# Patient Record
Sex: MALE | Race: WHITE
[De-identification: names, ages, dates, MRNs, and addresses within clinical notes are randomized per-mention and may not be internally consistent; named-entity substitution may affect disease eponyms.]

---

## 2023-07-24 ENCOUNTER — TRANSCRIPTION ENCOUNTER (OUTPATIENT)
Age: 68
End: 2023-07-24

## 2023-07-24 ENCOUNTER — APPOINTMENT (OUTPATIENT)
Dept: ORTHOPEDIC SURGERY | Facility: CLINIC | Age: 68
End: 2023-07-24
Payer: MEDICARE

## 2023-07-24 VITALS — HEIGHT: 74 IN | WEIGHT: 275 LBS | BODY MASS INDEX: 35.29 KG/M2

## 2023-07-24 DIAGNOSIS — I10 ESSENTIAL (PRIMARY) HYPERTENSION: ICD-10-CM

## 2023-07-24 DIAGNOSIS — Z00.00 ENCOUNTER FOR GENERAL ADULT MEDICAL EXAMINATION W/OUT ABNORMAL FINDINGS: ICD-10-CM

## 2023-07-24 DIAGNOSIS — M70.60 TROCHANTERIC BURSITIS, UNSPECIFIED HIP: ICD-10-CM

## 2023-07-24 PROCEDURE — 73522 X-RAY EXAM HIPS BI 3-4 VIEWS: CPT

## 2023-07-24 RX ORDER — METHYLPREDNISOLONE 4 MG/1
4 TABLET ORAL
Qty: 1 | Refills: 0 | Status: ACTIVE | COMMUNITY
Start: 2023-07-24 | End: 1900-01-01

## 2023-07-24 NOTE — IMAGING
[Bilateral] : hip with pelvis bilaterally [AP] : anteroposterior [Lateral] : lateral [There are no fractures, subluxations or dislocations. No significant abnormalities are seen] : There are no fractures, subluxations or dislocations. No significant abnormalities are seen [de-identified] : The patient is a well appearing 67 year.\par \par Patient ambulates with an antalgic gait. \par \par General: in no acute distress, seated comfortably, moving easily\par Skin: No discoloration, rashes; on palpation skin is dry, \par Neuro: Normal sensation all dermatomes, motor all myotomes\par Vascular: Normal pulses, no edema, normal temperature\par Coordination and balance: Normal\par Psych: normal mood and affect, non pressured speech, alert and oriented x3\par \par Pelvis: \par \par Symphysis pubis: Stable, no tenderness to palpation \par Palpable Hernias/Masses: None \par Resisted Sit Up: Negative \par \par Right Hip/Groin/Thigh: \par ROM: \par     Flexion: 0-120 degrees \par     Extension: 0-10 degrees \par     ABduction: 0-40 degrees \par     Adduction: 0-40 degrees \par     External Rotation: 0-40 degrees \par     Internal Rotation: 0-35 degrees \par PROVOCATIVE TESTING: \par      AV TST: Negative \par      DIMAS'S TST: Negative \par      PIRIFORMIS TST: Negative \par      Straight Leg Raise:  Negative \par      Seated Straight Leg Raise: Negative \par      IMPINGEMENT TST: Negative \par      Resisted ADduction : Negative \par \par PALPATION: \par         ASIS: Nontender \par         AIIS: Nontender \par         Greater Trochanter/IT-Band: Nontender \par         Illiac Crest: Nontender \par         Ischial Tuberosity: TTP \par         Hip Flexor: Nontender \par         Quadriceps: Nontender \par         Proximal Hamstring Origin: TTP \par         Proximal Hamstring Muscle-Tendon Junction: TTP \par         Hamstring Muscle Belly: Nontender \par         ADductor :  Nontender  \par         Lower Rectus Abdominis:  Nontender \par INSPECTION: \par         Deformity: No  \par         Erythema: No \par         Ecchymosis: No \par         Abrasions: No \par         Effusion: No \par         Groin mass/bulge: No \par        Palpable Hernia: No \par NEUROLOGIC EXAM: \par         Sensation L2-S1: Grossly Intact \par MOTOR EXAM: \par         Quadriceps: 5 out of 5 \par         Hamstrings: 5 out of 5 \par         ABduction: 5 out of 5 \par         ADduction: 5 out of 5 \par         Hip Flexion: 5 out of 5 \par         TA: 5 out of 5 \par         GS: 5 out of 5 \par Circulatory/Pulses: \par         Dorsalis Pedis:      2+ \par         Posterior Tibialis: 2+ \par  \par Left Hip/Groin/Thigh: \par ROM: \par     Flexion: 0-120 degrees \par     Extension: 0-10 degrees \par     ABduction: 0-40 degrees \par     Adduction: 0-40 degrees \par     External Rotation: 0-40 degrees \par     Internal Rotation: 0-35 degrees \par PROVOCATIVE TESTING: \par      AV TST: Negative \par      DIMAS'S TST: Negative \par      PIRIFORMIS TST: Negative \par      Straight Leg Raise:  Negative \par      Seated Straight Leg Raise: Negative \par      IMPINGEMENT TST: Negative \par      Resisted ADduction : Negative \par PALPATION: \par         ASIS: Nontender \par         AIIS: Nontender \par         Greater Trochanter/IT-Band: Nontender \par         Illiac Crest: Nontender \par         Ischial Tuberosity: Nontender \par         Hip Flexor: Nontender \par         Quadriceps: Nontender \par         Proximal Hamstring Origin: Nontender \par         Proximal Hamstring Muscle-Tendon Junction: Nontender \par         Hamstring Muscle Belly: Nontender \par         ADductor :  Nontender  \par         Lower Rectus Abdominis:  Nontender \par INSPECTION: \par         Deformity: No  \par         Erythema: No \par         Ecchymosis: No \par         Abrasions: No \par         Effusion: No \par         Groin mass/bulge: No \par        Palpable Hernia: No \par NEUROLOGIC EXAM: \par         Sensation L2-S1: Grossly Intact \par MOTOR EXAM: \par         Quadriceps: 5 out of 5 \par         Hamstrings: 5 out of 5 \par         ABduction: 5 out of 5 \par         ADduction: 5 out of 5 \par         Hip Flexion: 5 out of 5 \par         TA: 5 out of 5 \par         GS: 5 out of 5 \par Circulatory/Pulses: \par         Dorsalis Pedis:      2+ \par         Posterior Tibialis: 2+\par

## 2023-07-24 NOTE — HISTORY OF PRESENT ILLNESS
[de-identified] : 07/24/23: DARON is a 67 year old RHD M who presents today complaining of suyapa hip pain L>R \par Date of Injury/Onset: 2+ years\par Pain:    At Rest:  0/10 \par With Activity:  2-3 /10 \par Mechanism of injury: No specific cause of injury/ trauma\par Quality of symptoms: Sharp discomfort\par Improves with: N/A\par Worse with: Prolonged walking \par Prior treatment/ Imaging: None \par School/Sport/Position/Occupation: Retired  \par Additional Information: [None]\par

## 2024-04-23 RX ORDER — CELECOXIB 200 MG/1
200 CAPSULE ORAL
Qty: 60 | Refills: 0 | Status: ACTIVE | COMMUNITY
Start: 2024-04-23 | End: 1900-01-01

## 2024-05-09 ENCOUNTER — APPOINTMENT (OUTPATIENT)
Dept: ORTHOPEDIC SURGERY | Facility: CLINIC | Age: 69
End: 2024-05-09
Payer: MEDICARE

## 2024-05-09 DIAGNOSIS — M16.11 UNILATERAL PRIMARY OSTEOARTHRITIS, RIGHT HIP: ICD-10-CM

## 2024-05-09 DIAGNOSIS — M16.12 UNILATERAL PRIMARY OSTEOARTHRITIS, LEFT HIP: ICD-10-CM

## 2024-05-09 PROCEDURE — 99204 OFFICE O/P NEW MOD 45 MIN: CPT | Mod: 25

## 2024-05-09 PROCEDURE — 20611 DRAIN/INJ JOINT/BURSA W/US: CPT | Mod: 50

## 2024-05-09 RX ORDER — SEMAGLUTIDE 0.68 MG/ML
INJECTION, SOLUTION SUBCUTANEOUS
Refills: 0 | Status: ACTIVE | COMMUNITY

## 2024-05-09 RX ORDER — VALSARTAN 160 MG/1
160 TABLET, COATED ORAL
Refills: 0 | Status: ACTIVE | COMMUNITY

## 2024-05-09 RX ORDER — APIXABAN 5 MG/1
5 TABLET, FILM COATED ORAL
Refills: 0 | Status: ACTIVE | COMMUNITY

## 2024-05-09 RX ORDER — METOPROLOL TARTRATE 75 MG/1
75 TABLET, FILM COATED ORAL
Refills: 0 | Status: ACTIVE | COMMUNITY

## 2024-05-09 RX ORDER — HYDROCHLOROTHIAZIDE 12.5 MG/1
12.5 TABLET ORAL
Refills: 0 | Status: ACTIVE | COMMUNITY

## 2024-05-09 NOTE — HISTORY OF PRESENT ILLNESS
[de-identified] : DARON RANGEL is a 68-year male here with bilateral hip pain.  Pain has been present for over 5+ years and is located lateral.  Rates pain as  6-7/10 on pain scale.  Injury - No specific cause of injury/ trauma. Mechanical symptoms -   Shooting , Denies clicking/ popping  Exacerbating factors/activities/positions - Prolonged activity  Pain during or after activity - After activity  Back pain - No Radicular pain - No   Previous Treatment: Dr. Garber - prescribed methylPREDNISolone  NSAIDs: Celebrex PT:  None Surgery: N/A   Injections:  N/A Date of last injection:  N/A Numbing phase relief:  N/A Steroid phase relief: [ ]   Occupation:  Retired   Sports/Recreational Activities:

## 2024-05-09 NOTE — PROCEDURE
[de-identified] : Ultrasound guidance was required to improve accuracy and the diagnostic and/or therapeutic value of the hip injection as well as to avoid critical neurovascular structures located in close proximity to the hip joint [FreeTextEntry1] : B/L hip US CSI [FreeTextEntry2] : pain [FreeTextEntry3] :  The patient was positioned supine on the exam table with the right anterior hip exposed. All risks and benefits were discussed with the patient including infection, bleeding, allergic reactions, worsening of symptoms, and possible neurovascular damage.  They stated understanding and were agreeable to proceed.   The skin overlying the injection site was sterilely prepped and ultrasound was used to localize the hip joint.  A 22 gauge spinal needle was inserted into the hip joint which was confirmed with ultrasound.  40mg Kenalog and 4cc of 0.5% Marcaine was then injected into the hip joint with confirmed ballottement of the hip capsule.  The needle was withdrawn and a band aid was placed.  An identical procedure was performed on the contralateral side.  The patient tolerated the procedure well without any apparent complications.  They were instructed to perform activities that would normally aggravate their hip and to take note of how much relief they get from the injection.  Counseled on concerning signs and symptoms including redness/swelling and to call the office with any problems.

## 2024-05-09 NOTE — IMAGING
[de-identified] : General: NAD, A&Ox3 Gait: Mildly antalgic, no Trendelenburg Foot Progression: neutral Knee Progression: neutral   B/L Hip Exam: Pain with Log Roll - negative Flexion: 100 Pain with Hyperflexion - no FADIR - pos AV - pos Extension: 0 Flexion Contracture - none Prone Apprehension Test - neg Ischial tenderness - none Trochanteric tenderness - none   Abduction - 4 /5, pain - yes Adduction - 5 /5 Supine resisted SLR - 5 /5, pain - yes Dorsiflexion 5/5 Plantarflexion 5/5 EHL 5/5 DP/PT 2+, foot is warm and well perfused      Leg Lengths: symmetric

## 2024-05-09 NOTE — DATA REVIEWED
[Outside X-rays] : outside x-rays [Bilateral] : of the bilateral [Hip] : hip [I independently reviewed and interpreted images and report] : I independently reviewed and interpreted images and report [FreeTextEntry1] : Tonnis 3 osteoarthritis

## 2024-05-09 NOTE — DISCUSSION/SUMMARY
[de-identified] : - We discussed their diagnosis and treatment options at length including the risks and benefits of both surgical treatment with a total hip replacement and non-surgical options. - We will continue conservative treatment with activity modification, PT, icing, weight loss, and anti-inflammatory medications. - The patient was provided with a PT prescription to work on ROM, hip ER/abductors strengthening, quad/hamstring stretches and strengthening, and other exercises - The patient was advised to let pain guide the gradual advancement of activities. - We also discussed the possible of a corticosteroid injection in order to help decrease inflammation and pain so that they can perform better therapy.  This was performed bilaterally today. - We discussed that a total joint replacement cannot be done within 3 months of a steroid injection to minimize the risk of infection - F/u in 3 months for recheck     Prior to appointment and during encounter with patient extensive medical records were reviewed including but not limited to, hospital records, out patient records, imaging results, and lab data. During this appointment the patient was examined, diagnoses were discussed and explained in a face to face manner. In addition extensive time was spent reviewing aforementioned diagnostic studies. Counseling including abnormal image results, differential diagnoses, treatment options, risk and benefits, lifestyle changes, current condition, and current medications was performed. Patient's comments, questions, and concerns were address and patient verbalized understanding.

## 2024-05-20 ENCOUNTER — RX RENEWAL (OUTPATIENT)
Age: 69
End: 2024-05-20